# Patient Record
Sex: FEMALE | Race: WHITE | NOT HISPANIC OR LATINO | Employment: STUDENT | URBAN - METROPOLITAN AREA
[De-identification: names, ages, dates, MRNs, and addresses within clinical notes are randomized per-mention and may not be internally consistent; named-entity substitution may affect disease eponyms.]

---

## 2023-08-15 ENCOUNTER — DOCUMENTATION (OUTPATIENT)
Dept: BARIATRICS/WEIGHT MGMT | Facility: CLINIC | Age: 23
End: 2023-08-15
Payer: COMMERCIAL

## 2023-08-15 ENCOUNTER — OFFICE VISIT (OUTPATIENT)
Dept: BARIATRICS/WEIGHT MGMT | Facility: CLINIC | Age: 23
End: 2023-08-15
Payer: COMMERCIAL

## 2023-08-15 ENCOUNTER — OFFICE VISIT (OUTPATIENT)
Dept: BEHAVIORAL HEALTH | Facility: CLINIC | Age: 23
End: 2023-08-15
Payer: COMMERCIAL

## 2023-08-15 VITALS
HEIGHT: 60 IN | WEIGHT: 226 LBS | DIASTOLIC BLOOD PRESSURE: 60 MMHG | TEMPERATURE: 97.8 F | OXYGEN SATURATION: 98 % | HEART RATE: 64 BPM | BODY MASS INDEX: 44.37 KG/M2 | SYSTOLIC BLOOD PRESSURE: 122 MMHG

## 2023-08-15 DIAGNOSIS — Z71.89 ENCOUNTER FOR PSYCHOLOGICAL ASSESSMENT PRIOR TO BARIATRIC SURGERY: ICD-10-CM

## 2023-08-15 DIAGNOSIS — R06.00 DYSPNEA, UNSPECIFIED TYPE: ICD-10-CM

## 2023-08-15 DIAGNOSIS — M54.9 BACK PAIN, UNSPECIFIED BACK LOCATION, UNSPECIFIED BACK PAIN LATERALITY, UNSPECIFIED CHRONICITY: ICD-10-CM

## 2023-08-15 DIAGNOSIS — K21.9 GASTROESOPHAGEAL REFLUX DISEASE, UNSPECIFIED WHETHER ESOPHAGITIS PRESENT: ICD-10-CM

## 2023-08-15 DIAGNOSIS — F41.9 ANXIETY: ICD-10-CM

## 2023-08-15 DIAGNOSIS — E66.01 MORBID OBESITY WITH BMI OF 40.0-44.9, ADULT: Primary | ICD-10-CM

## 2023-08-15 DIAGNOSIS — R53.83 FATIGUE, UNSPECIFIED TYPE: Primary | ICD-10-CM

## 2023-08-15 DIAGNOSIS — F41.9 MILD ANXIETY: ICD-10-CM

## 2023-08-15 DIAGNOSIS — E66.01 OBESITY, CLASS III, BMI 40-49.9 (MORBID OBESITY): ICD-10-CM

## 2023-08-15 PROCEDURE — 90791 PSYCH DIAGNOSTIC EVALUATION: CPT | Performed by: PSYCHOLOGIST

## 2023-08-15 NOTE — PROGRESS NOTES
"Weight Loss Surgery  Presurgical Nutrition Assessment     Bridget Cabello  08/15/2023  [id number]  [id number]  2000   female    Surgery desired: LSG (sleeve gastrectomy)     HEIGHT 152.4 cm (60\")   WEIGHT 103 kg (226 #)   BMI 44.14        Highest weight ever: 107 kg (235 #)      No Known Allergies  No current outpatient medications on file.      Subjective information: Met with patient and her , who is very supportive of her efforts. Patient's mother had sleeve gastrectomy in 2014 and a revision, and is still maintaining weight.  Two other family members are considering the surgery also. Patient has participated in no weight management group such as [facility].  She finishes [facility] work [facility] [employer] [facility] and plans to focus further on nutrition after this.      Nutrition Recall   Example of Usual 24 hour intake:     Breakfast:skips OR occasionally, a breakfast sandwich. Water to drink in the morning. (Often in a hurry to leave for her class with no time to eat).    Lunch: primarily fast food: Sonic wings with water and tater tots OR [employer]aco [employer], which is her favorite, often with a soda.   Occasional Big Red fountain beverage.     Dinner: usually at home:   steak, mac'n cheese, potatoes OR out to eat sometimes. Usually water to drink.    Snacks: Chips (Ruffles or some Wavy potato chip).  No sweets.  Water to drink.    Beverages of Choice: primarily water. No coffee or tea reported. Occasional soda, as above.    Food Allergies or Intolerances: none stated or recorded          Exercise / Activity: no personal activity program at this time.  Is on her legs all the time at school, she reports.       Assessment of Nutritional Adequacy, Excessive Intake or Deficiencies:        Protein intake is far too low to ensure successful weight loss. Protein in too few eating episodes.                                        Processed / simple Carbohydrate intake is: reasonable - eats few sweets                             "           Balance of diet with a variety of fruits and vegetables is: diet is not balanced.  Only starchy vegetables recorded.                                                        Reliance on restaurant food including fast food is: very high.  As above, and also records pizza or mexican food out.                                                                           Ingestion of sweet beverages eg soda, sweet tea, fruit juice: moderate to low amount.      Education    Provided Nutrition Guidelines for Bariatric and Metabolic Surgery   Reviewed guidelines for higher protein, limited carbohydrate diet to promote weight loss.  Encouraged patient to incorporate these principles of healthy eating.    Educated patient to wisely choose an appropriate protein supplement beverage for the post-surgery liquid diet.  Provided product guidelines and examples.    Explained importance of goal setting to help in changing eating behaviors that are not conducive to weight loss.  Specific macronutrient goals as below.   Provided follow-up options for support, including contact information for dietitians here.     Discussed importance of tracking grams of protein and carbohydrate in diet.  Web-based support information and apps for smart phones and computers given.        Nutrition Goals   Protein goal:  grams per day in three regular balanced meals and two to three high protein snacks each day, to ensure desired weight loss.   Carbohydrate goal:  100-140 grams per day  Beverage goal: Appropriate non-carbonated beverage intake.  Patient to wean self off of any sweet beverages, including soda.    Exercise Goals  Continue current exercise routine, if appropriate, and obtain approval from caregiver if physically limited for any reason.   Start activity plan per PCP/specialist advice if not currently exercising.     Recommend that team proceed with surgery and follow per protocol.   Garima Gutierres RD  08/15/2023  15:46 EDT

## 2023-08-15 NOTE — PROGRESS NOTES
PROGRESS NOTE    Data:    Bridget Cabello is a 22 y.o. female who met with the undersigned for a scheduled psychological evaluation from 1:30 - 2:15 pm.      Clinical Maneuvering/Intervention:      Chief complaint and history of presenting illness/Problems: struggling with obesity for several years. Despite trying different weight loss plans and diets, the pt reported being unsuccessful in losing weight. A psychological evaluation was conducted in order to assess past and current level of functioning. Areas assessed included, but were not limited to: perception of social support, perception of ability to face and deal with challenges in life (positive functioning), anxiety symptoms, depressive symptoms, perspective on beliefs/belief system, coping skills for stress, intelligence level, addiction issues, etc. Therapeutic rapport was established. Interventions conducted today were geared towards assessing the pt's readiness for weight loss surgery and identifying and psychological contraindications for undergoing such a major life change. Social support was deemed strong (specific to weight loss surgery/weight loss in this manner and in a general sense): boyfriend, mother, father, and friends. Current psychological struggles were described as low, but included: anxiety and frustrations with being overweight. Coping skills for distress and related to undergoing a major life change such as weight loss surgery/weight loss were deemed strong and included good sense of humor, follows directions well, responsible person, maintains quality relationships with others, and believes in herself that she will be successful with weight loss surgery. The pt endorsed having characteristics of readiness to undergo major life changes inherent in the journey of weight loss surgery. She could speak to having 'suffered enough,' and the decision to have weight loss surgery has 'clicked' for her. The pt expressed gratitude for today's visit.      Past Family and Social History:      History of family mental health problems: none endorsed    Psychosocial history: treatment of psychiatric care in the past (N/A), alcohol/substance abuse treatment in the past (N/A) , alcohol/substance abuse problems (N/A), inpatient psychiatric care (N/A).    Mental Status Exam (MSE):  Hygiene:  good  Dress: normal  Attitude:  cooperative and proactive  Motor Activity: normal  Speech: normal  Mood:   positive  Affect:  congruent  Thought Processes: normal  Thought Content:  normal  Suicidal Thoughts:  not endorsed  Homicidal Thoughts:  not endorsed  Crisis Safety Plan: not needed   Hallucinations:  none      Patient's Support Network Includes:  family, friends      Progress toward goal: there is evidence to suggest that she is taking measures to improve the quality of her life including seeking weight loss surgery      Functional Status: moderate to high      Prognosis: good with weight loss surgery    Evaluation, Diagnoses, and Ability/Capacity to Respond to Treatment:      The pt presented to be struggling with mild anxiety and frustrations with being overweight (BMI = 44.14, morbid obesity). Results of MSE demonstrated a functional status of moderate to high. Strengths: belief in self that she will be successful with weight loss surgery, etc (see detailed list of coping skills above). Needed for growth (CPT code requirement for Weaknesses): weight loss.      From a psychological standpoint, the pt presents as a good candidate for bariatric surgery. She is motivated for the surgery, has showed readiness for the lifestyle change in terms of starting to adjust her eating habits, and seems to have appropriate expectations of how to prepare and how to live after surgery in order to lose weight successfully.    Treatment Plan:      Short term goals: Start improving her health by following up with her bariatric surgeon in order to receive weight loss surgery as soon as  feasible/appropriate and demonstrate success with compliance to adhering to the recommended diet. Long term goals: reach a healthy weight and overall improved mood via taking control over her health.    Sharlene Mike, PhD, LP

## 2023-08-15 NOTE — PROGRESS NOTES
[facility] BARIATRIC SURGERY  [address]  [address]  [facility] [id number] [phone]      Patient  Name:  Bridget Cabello  :  2000        Chief Complaint:  weight gain; unable to maintain weight loss    History of Present Illness:  Bridget Cabello is a 22 y.o. female who presents today for evaluation, education and consultation regarding bariatric and metabolic surgery. The patient is interested in sleeve gastrectomy with DR. Kenny.     Bridget has been overweight for at least 8 years, has been 35 pounds or more overweight for at least 8 years, has been 100 pounds or more overweight for 5 or more years and started dieting at age 6.      Previous diet attempts include: Low Fat, [facility], and Calorie Counting; None; Dexatrim, Amphetamines, Ionamin/Adipex, and Prozac.  The most weight Bridget lost was 5 pounds on diet pills but was only able to maintain that weight loss for 6 months.  Her maximum lifetime weight is 235 pounds.    As above, patient has been overweight for many years, with numerous failed dietary/weight loss attempts.  She now has obesity related comorbidities and as such has decided to pursue weight loss surgery.    Patient is pursuing gastric sleeve to improve overall health.  Her mother is status post bariatric surgery with Dr. Kenny .    History of acid reflux occasionally not treated with medication denies past EGD or known H. pylori.  Gallbladder present.  No other GI symptoms.  Anxiety, back pain also not requiring medication.  Other review of systems negative.    All other past medical, surgical, social and family history have been obtained and discussed as pertinent to bariatric surgery as below.     Had covid in , was not hospitalized, is not vaccinated.          Past Medical History:   Diagnosis Date    Acid reflux     not treated with medication, no past EGD or known h pylori    Anxiety     Back pain     not treated with meds     Past Surgical  History:   Procedure Laterality Date     SECTION  2018     SECTION      WISDOM TOOTH EXTRACTION         No Known Allergies  No current outpatient medications on file.    Social History     Socioeconomic History    Marital status: Single   Tobacco Use    Smoking status: Never    Smokeless tobacco: Never   Vaping Use    Vaping Use: Never used   Substance and Sexual Activity    Alcohol use: Never    Drug use: Never    Sexual activity: Defer     Family History   Problem Relation Age of Onset    Obesity Mother     Cancer Maternal Grandmother     Diabetes Maternal Grandfather     Hypertension Maternal Grandfather     Stroke Maternal Grandfather     Heart attack Maternal Grandfather     Sleep apnea Maternal Grandfather     Obesity Paternal Grandmother     Sleep apnea Paternal Grandfather     Stroke Paternal Grandfather     Hypertension Paternal Grandfather     Obesity Paternal Grandfather        Review of Systems:  Constitutional:  Reports fatigue, weight gain and denies fevers, chills.  HEENT:  denies headache, ear pain or loss of hearing, blurred or double vision, nasal discharge or sore throat.  Cardiovascular:  Reports none and denies HTN, HLD, CAD, Atrial Fib, hx heart disease, heart murmur, hx MI, chest pain, palpitations, edema, hx DVT.  Respiratory:  Reports none and denies dyspnea on exertion, shortness of breath , cough , wheezing, sleep apnea, asthma, COPD, hx PE.  Gastrointestinal:  Reports heartburn and denies dysphagia, nausea, vomiting, abdominal pain, IBS, diarrhea, constipation, melena, blood in stool, gallbladder issues, liver disease, hx pancreatitis.  Genitourinary:  Reports none and denies history of  frequent UTI, incontinence, hematuria, dysuria, polyuria, polydipsia, renal insufficiency, renal failure.    Musculoskeletal:  Reports back pain and denies joint pain, fibromyalgia, arthritis, and autoimmune disease.  Neurological:  Reports none and denies headaches, migraines,  numbness /tingling, dizziness, confusion, seizure, stroke.  Psychiatric:  Reports anxiety and denies bipolar disorder, suicidal ideation, hx suicide attempt, hx self injury, hx substance abuse, eating disorder.  Endocrine:  Reports none and denies PCOS, endometriosis, glucose intolerance, diabetes, thyroid disease, gout.  Hematologic:  Reports none and denies bruising, bleeding disorder, hx anemia, hx blood transfusion.  Skin:  Reports none and denies rashes, hx MRSA.      Physical Exam:  Vital Signs:  Weight: 103 kg (226 lb)   Body mass index is 44.14 kg/mý.  Temp: 97.8 øF (36.6 øC)   Heart Rate: 64   BP: 122/60     Physical Exam  Vitals reviewed.   Constitutional:       Appearance: She is well-developed. She is obese.   HENT:      Head: Normocephalic.   Neck:      Thyroid: No thyromegaly.   Cardiovascular:      Rate and Rhythm: Normal rate and regular rhythm.      Heart sounds: Normal heart sounds.   Pulmonary:      Effort: Pulmonary effort is normal. No respiratory distress.      Breath sounds: Normal breath sounds. No wheezing.   Abdominal:      General: Bowel sounds are normal. There is no distension.      Palpations: Abdomen is soft.      Tenderness: There is no abdominal tenderness.      Comments: Low transverse  scar   Musculoskeletal:         General: Normal range of motion.      Cervical back: Normal range of motion and neck supple.   Skin:     General: Skin is warm and dry.   Neurological:      Mental Status: She is alert and oriented to person, place, and time.   Psychiatric:         Mood and Affect: Mood normal.         Behavior: Behavior normal.         Thought Content: Thought content normal.         Judgment: Judgment normal.       There is no problem list on file for this patient.      Assessment:    Bridget Cabello is a 22 y.o. year old female with medically complicated obesity pursuing sleeve gastrectomy.    Weight loss surgery is deemed medically necessary given the following obesity  related comorbidities including back pain and GERD with current Weight: 103 kg (226 lb) and Body mass index is 44.14 kg/mý..    Plan:  The consultation plan and program requirements were reviewed with the patient.  The patient has been advised that a letter of medical support must be obtained from her primary care physician or referring provider. A psychological evaluation will be arranged.  A nutritional evaluation will be performed.  The patient was advised to start a high protein and low carbohydrate diet.  Necessary lifestyle modifications were discussed.  Instructions on how to access HAVEN was given to the patient.  HAVEN is an internet based educational video that explains the surgical procedure chosen and answers basic questions regarding that procedure.     Class 3 Severe Obesity (BMI >=40). Obesity-related health conditions include the following:  acid reflux, back pain . Obesity is worsening. BMI is is above average; BMI management plan is completed. We discussed consulting a Bariatric surgeon.        Preoperative testing will include: CBC, CMP, Lipids, TSH, HgA1C, H.Pylori UBT, EKG, CXR, and EGD     Additional preop clearances required prior to surgery:  none .      The patient has been educated on expected postoperative lifestyle changes, including commitment to high protein diet, vitamin regimen, and exercise program.  They are aware that support groups are encouraged for optimal weight loss results. Patient understands that bariatric surgery is not cosmetic surgery but rather a tool to help make a lifelong commitment to lifestyle changes including diet, exercise, behavior modifications, and healthy habits. The procedure was discussed with the patient and all questions were answered. The importance of avoiding ASA/ NSAIDS/ steroids/ tobacco/ hormones/ immunomodulators perioperatively was discussed.         Kamille Randall PA-C

## 2023-08-17 LAB — UREA BREATH TEST QL: NEGATIVE
